# Patient Record
Sex: FEMALE | Race: WHITE | NOT HISPANIC OR LATINO | Employment: OTHER | URBAN - METROPOLITAN AREA
[De-identification: names, ages, dates, MRNs, and addresses within clinical notes are randomized per-mention and may not be internally consistent; named-entity substitution may affect disease eponyms.]

---

## 2020-09-15 ENCOUNTER — OFFICE VISIT (OUTPATIENT)
Dept: ORTHOPEDIC SURGERY | Facility: CLINIC | Age: 85
End: 2020-09-15

## 2020-09-15 VITALS — BODY MASS INDEX: 33.13 KG/M2 | TEMPERATURE: 97.9 F | HEIGHT: 62 IN | WEIGHT: 180 LBS

## 2020-09-15 DIAGNOSIS — R52 PAIN: Primary | ICD-10-CM

## 2020-09-15 DIAGNOSIS — M54.41 ACUTE RIGHT-SIDED LOW BACK PAIN WITH RIGHT-SIDED SCIATICA: ICD-10-CM

## 2020-09-15 PROCEDURE — 99214 OFFICE O/P EST MOD 30 MIN: CPT | Performed by: NURSE PRACTITIONER

## 2020-09-15 PROCEDURE — 73562 X-RAY EXAM OF KNEE 3: CPT | Performed by: NURSE PRACTITIONER

## 2020-09-15 RX ORDER — ALPRAZOLAM 0.5 MG/1
TABLET ORAL
COMMUNITY
Start: 2020-09-13

## 2020-09-15 RX ORDER — CITALOPRAM 40 MG/1
TABLET ORAL
COMMUNITY
Start: 2020-04-25

## 2020-09-15 RX ORDER — LOSARTAN POTASSIUM 25 MG/1
25 TABLET ORAL DAILY
COMMUNITY
Start: 2020-01-31 | End: 2021-01-30

## 2020-09-15 RX ORDER — BUPROPION HYDROCHLORIDE 150 MG/1
TABLET ORAL
COMMUNITY
Start: 2020-04-25

## 2020-09-15 RX ORDER — SIMVASTATIN 20 MG
20 TABLET ORAL DAILY
COMMUNITY
Start: 2020-06-05 | End: 2021-06-05

## 2020-09-15 RX ORDER — ZOLPIDEM TARTRATE 5 MG/1
TABLET ORAL
COMMUNITY
Start: 2020-08-21

## 2020-09-15 NOTE — PROGRESS NOTES
willowPatient: Thi Concepcion  YOB: 1932 88 y.o. female  Medical Record Number: [id number]    Chief Complaints: Right knee pain    History of Present Illness:Thi Concepcion is a 88 y.o. female who presents with complaints of low back right leg and knee pain.  The patient had previous right total knee replacement several years ago have been doing okay but started with pain in her lower back weakness in her leg pain in her right knee several weeks ago.  Denies any injury.  Describes the pain as a moderate intermittent ache worse with standing walking, better with rest.  She denies any numbness or tingling, denies any change in bowel or bladder habits    Allergies:   Allergies   Allergen Reactions   • Lisinopril Dizziness     cough   • Sulfamethoxazole Nausea Only       Medications:   Current Outpatient Medications   Medication Sig Dispense Refill   • ALPRAZolam (XANAX) 0.5 MG tablet TAKE ONE TABLET BY MOUTH TWICE A DAY     • buPROPion XL (Wellbutrin XL) 150 MG 24 hr tablet TAKE 1 TABLET DAILY     • citalopram (CeleXA) 40 MG tablet TAKE 1 TABLET DAILY     • cyanocobalamin (VITAMIN B-12) 1000 MCG tablet Take 100 mcg by mouth Daily.     • loratadine-pseudoephedrine (CLARITIN-D 24-hour)  MG per 24 hr tablet Take 1 tablet by mouth Daily.     • losartan (COZAAR) 25 MG tablet Take 25 mg by mouth Daily.     • simvastatin (ZOCOR) 20 MG tablet Take 20 mg by mouth Daily.     • zolpidem (AMBIEN) 5 MG tablet TAKE ONE TABLET BY MOUTH EVERY NIGHT AT BEDTIME AS NEEDED FOR SLEEP       No current facility-administered medications for this visit.          The following portions of the patient's history were reviewed and updated as appropriate: allergies, current medications, past family history, past medical history, past social history, past surgical history and problem list.    Review of Systems:   A 14 point review of systems was performed. All systems negative except pertinent positives/negative listed in HPI  "above    Physical Exam:   Vitals:    09/15/20 1051   Temp: 97.9 °F (36.6 °C)   Weight: 81.6 kg (180 lb)   Height: 157.5 cm (62\")   PainSc:   6       General: A and O x 3, ASA, NAD    SCLERA:    Normal    DENTITION:   Normal  Skin clear no unusual lesions noted  Right knee patient has well-healed surgical incision noted excellent range of motion with no instability calf soft nontender she does have limited range of motion of lower back with a positive right straight leg raise neurologic intact      Radiology:  Xrays 3views (ap,lateral, sunrise) right knee were ordered and reviewed today secondary to pain and show a well-placed well-positioned right total knee replacement, compared to views are unchanged also reviewed previous lumbar spine x-rays which show significant arthritic changes    Assessment/Plan: Low back pain with radiculopathy  Status post right TKA stable    Patient I discussed options, it appears as though the majority of her pain is coming from her lower back, we will proceed with an MRI of the lumbar spine to further evaluate and the patient will call couple days after regarding results and treatment options      Debby Amaya, APRN    9/15/2020  "

## 2020-09-27 ENCOUNTER — HOSPITAL ENCOUNTER (OUTPATIENT)
Dept: MRI IMAGING | Facility: HOSPITAL | Age: 85
Discharge: HOME | End: 2020-09-27
Admitting: NURSE PRACTITIONER

## 2020-09-27 DIAGNOSIS — M54.41 ACUTE RIGHT-SIDED LOW BACK PAIN WITH RIGHT-SIDED SCIATICA: ICD-10-CM

## 2020-09-27 PROCEDURE — 72148 MRI LUMBAR SPINE W/O DYE: CPT

## 2020-09-28 ENCOUNTER — TELEPHONE (OUTPATIENT)
Dept: ORTHOPEDIC SURGERY | Facility: CLINIC | Age: 85
End: 2020-09-28

## 2020-09-28 NOTE — TELEPHONE ENCOUNTER
Called to inform pt of MRI results told results but got disconnected. Tried to call back and busy signal.    ----- Message from LELO Regalado sent at 9/28/2020 10:36 AM EDT -----  Please have patient know that the MRI of the lumbar spine shows significant arthritic changes noted as well as narrowing.  Would recommend epidural injections and physical therapy if still having pain

## 2023-05-09 ENCOUNTER — TELEPHONE (OUTPATIENT)
Dept: ORTHOPEDIC SURGERY | Facility: CLINIC | Age: 88
End: 2023-05-09